# Patient Record
Sex: FEMALE | Race: WHITE | NOT HISPANIC OR LATINO
[De-identification: names, ages, dates, MRNs, and addresses within clinical notes are randomized per-mention and may not be internally consistent; named-entity substitution may affect disease eponyms.]

---

## 2022-10-14 PROBLEM — Z00.00 ENCOUNTER FOR PREVENTIVE HEALTH EXAMINATION: Status: ACTIVE | Noted: 2022-10-14

## 2023-05-03 ENCOUNTER — APPOINTMENT (OUTPATIENT)
Dept: ORTHOPEDIC SURGERY | Facility: CLINIC | Age: 48
End: 2023-05-03
Payer: COMMERCIAL

## 2023-05-03 VITALS — WEIGHT: 175 LBS | BODY MASS INDEX: 27.47 KG/M2 | HEIGHT: 67 IN

## 2023-05-03 DIAGNOSIS — M23.91 UNSPECIFIED INTERNAL DERANGEMENT OF RIGHT KNEE: ICD-10-CM

## 2023-05-03 DIAGNOSIS — M79.18 MYALGIA, OTHER SITE: ICD-10-CM

## 2023-05-03 DIAGNOSIS — Z78.9 OTHER SPECIFIED HEALTH STATUS: ICD-10-CM

## 2023-05-03 PROCEDURE — 73564 X-RAY EXAM KNEE 4 OR MORE: CPT | Mod: RT

## 2023-05-03 PROCEDURE — 99204 OFFICE O/P NEW MOD 45 MIN: CPT

## 2023-05-03 RX ORDER — NAPROXEN 500 MG/1
500 TABLET ORAL TWICE DAILY
Qty: 60 | Refills: 0 | Status: ACTIVE | COMMUNITY
Start: 2023-05-03 | End: 1900-01-01

## 2023-05-03 NOTE — ASSESSMENT
[FreeTextEntry1] : Right  X-Ray Examination of the KNEE (4 views): there are no fractures, subluxations or dislocations.\par \par Due to the patients mechanical symptoms along with medial joint line pain, effusion, and pos lilli test on exam we will get an mri to eval for medial meniscus tear\par \par - The patient was advised of the diagnosis.  The natural history of the pathology was explained to the patient in layman's terms.  Several different treatment options were discussed and explained including the risks and benefits of both surgical and non-surgical treatments.\par - The patient was advised to apply ice (wrapped in a towel or protective covering) to the area daily (20 minutes at a time, 2-4X/day).\par - The patient was advised to modify their activities.\par - Naoryn rx\par - Patient was given a prescription for an anti-inflammatory medication.  They will take it for the next week and then on an as needed basis, as long as there are no medical contra-indications.  Patient is counseled on possible GI, renal, and cardiovascular side effects.\par - f/u after mri\par \par

## 2023-05-03 NOTE — HISTORY OF PRESENT ILLNESS
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 occasional buckling sensation.\par The pain is located  anterior, medial\par The pain is associated with  popping, catching and locking\par Worse with activity and better at rest.\par Has tried ice, nsaids, brace \par h/o L meniscus surgery dr. boone approx 2011\par

## 2023-05-03 NOTE — IMAGING
[de-identified] : \par RIGHT KNEE\par Inspection:  mild effusion\par Palpation: medial joint line tenderness \par Knee Range of Motion:  0-130 \par Strength: 5/5 Quadriceps strength, 5/5 Hamstring strength\par Neurological: light touch is intact throughout\par Ligament Stability and Special Tests: \par McMurrays: Positive\par Lachman: neg\par Pivot Shift: neg\par Posterior Drawer: neg\par Valgus: neg\par Varus: neg\par Patella Apprehension: neg\par Patella Maltracking: neg\par

## 2023-05-17 ENCOUNTER — TRANSCRIPTION ENCOUNTER (OUTPATIENT)
Age: 48
End: 2023-05-17

## 2023-05-31 ENCOUNTER — APPOINTMENT (OUTPATIENT)
Dept: ORTHOPEDIC SURGERY | Facility: CLINIC | Age: 48
End: 2023-05-31
Payer: COMMERCIAL

## 2023-05-31 DIAGNOSIS — M67.51 PLICA SYNDROME, RIGHT KNEE: ICD-10-CM

## 2023-05-31 PROCEDURE — 99214 OFFICE O/P EST MOD 30 MIN: CPT

## 2023-05-31 NOTE — ASSESSMENT
[FreeTextEntry1] : mri right knee [facility] 5/12/23 - MMT with 10mm cyst post to root, LMT, plica, cartilage fisssure trochlea, chondromalacai, ST ganglia post to lt gastroc\par \par \par - We discussed their diagnosis and treatment options at length including the risks and benefits of both surgical and non-surgical options.. \par - Given their active lifestyle along with pain and mechanical symptoms they are a surgical candidate.\par - The risks, benefits, and alternatives to right  knee PMM/PLM, plica, abras (trochlea), cyst excision were discussed with the patient, all questions were answered.\par \par

## 2023-05-31 NOTE — IMAGING
[de-identified] : \par RIGHT KNEE\par Inspection:  mild effusion\par Palpation: medial and lateral joint line tenderness \par Knee Range of Motion:  0-130 \par Strength: 5/5 Quadriceps strength, 5/5 Hamstring strength\par Neurological: light touch is intact throughout\par Ligament Stability and Special Tests: \par McMurrays: Positive\par Lachman: neg\par Pivot Shift: neg\par Posterior Drawer: neg\par Valgus: neg\par Varus: neg\par Patella Apprehension: neg\par Patella Maltracking: neg\par

## 2023-05-31 NOTE — DISCUSSION/SUMMARY
[de-identified] : The patient was advised of the diagnosis.  The natural history of the pathology was explained in full to the patient in layman's terms.  Several different treatment options were discussed and explained to the patient including the risks and benefits of both surgical and non-surgical treatments.\par \par The risks, benefits, and alternatives to surgical arthroscopy of the right knee with partial medial / lateral meniscectomy, plica excision, cyst excision, and abrasion chondroplasty of the trochlea were reviewed with the patient.  Specifically, I reviewed with the patient that any anterior knee pain may paradoxically worsen for the first six weeks following arthroscopy due to quadriceps weakness. Further, I reviewed with the patient that while arthroscopic treatment typically provides substantial relief of the symptoms (posteromedial or posterolateral joint line pain and mechanical symptoms) related to meniscus tears, arthroscopic treatments typically have very minimal relief of symptoms (anterior knee pain) related to chondromalacia patella or early osteoarthritis.  The risk of recurrent tears as well as progression of occult or underlying arthritis, avascular necrosis, and / or chondrolysis were discussed as well. The patient clearly communicated that these issues were understood.  \par \par We also discussed that the risks of surgery include but are not limited to pain, infection (superficial or deep), bleeding, vascular injury, numbness, tingling, nerve damage (direct or indirect), scarring, wound breakdown, failure to resolve symptoms, symptom recurrence, the need for further surgery as well as medical complications such as blood clots, pulmonary embolism, heart attack, stroke, and other anesthesia complications including even death.  The patient clearly communicated that these risks were understood and wished to proceed. This will be scheduled accordingly.\par \par

## 2023-05-31 NOTE — HISTORY OF PRESENT ILLNESS
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 occasional buckling sensation.\par The pain is located  anterior, medial\par The pain is associated with  popping, catching and locking\par Worse with activity and better at rest.\par Has tried ice, nsaids, brace \par h/o L meniscus surgery dr. boone approx 2011\par \par 5/31/23 - using nsaids, icing, mod activiyt, cont catching and locking, had mri showing tears\par

## 2023-06-07 ENCOUNTER — NON-APPOINTMENT (OUTPATIENT)
Age: 48
End: 2023-06-07

## 2023-06-23 ENCOUNTER — APPOINTMENT (OUTPATIENT)
Age: 48
End: 2023-06-23
Payer: COMMERCIAL

## 2023-06-23 PROCEDURE — 29876 ARTHRS KNEE SURG SYNVCT MAJ: CPT | Mod: 59,RT

## 2023-06-23 PROCEDURE — 29876 ARTHRS KNEE SURG SYNVCT MAJ: CPT | Mod: AS,59,RT

## 2023-06-23 PROCEDURE — 29879 ARTHRS KNE SRG ABRASJ ARTHRP: CPT | Mod: 59,RT

## 2023-06-23 PROCEDURE — 27347 REMOVE KNEE CYST: CPT | Mod: AS,59,RT

## 2023-06-23 PROCEDURE — 29879 ARTHRS KNE SRG ABRASJ ARTHRP: CPT | Mod: AS,59,RT

## 2023-06-23 PROCEDURE — 27347 REMOVE KNEE CYST: CPT | Mod: 59,RT

## 2023-06-23 PROCEDURE — 29880 ARTHRS KNE SRG MNISECTMY M&L: CPT | Mod: AS,RT

## 2023-06-23 PROCEDURE — 29880 ARTHRS KNE SRG MNISECTMY M&L: CPT | Mod: RT

## 2023-06-23 RX ORDER — ACETAMINOPHEN 500 MG/1
500 TABLET ORAL
Qty: 90 | Refills: 0 | Status: ACTIVE | COMMUNITY
Start: 2023-06-23 | End: 1900-01-01

## 2023-06-23 RX ORDER — OXYCODONE 5 MG/1
5 TABLET ORAL
Qty: 12 | Refills: 0 | Status: ACTIVE | COMMUNITY
Start: 2023-06-23 | End: 1900-01-01

## 2023-06-23 RX ORDER — PROMETHAZINE HYDROCHLORIDE 12.5 MG/1
12.5 TABLET ORAL EVERY 6 HOURS
Qty: 20 | Refills: 0 | Status: ACTIVE | COMMUNITY
Start: 2023-06-23 | End: 1900-01-01

## 2023-06-23 RX ORDER — IBUPROFEN 600 MG/1
600 TABLET, FILM COATED ORAL
Qty: 20 | Refills: 0 | Status: ACTIVE | COMMUNITY
Start: 2023-06-23 | End: 1900-01-01

## 2023-06-27 PROBLEM — S83.231D COMPLEX TEAR OF MEDIAL MENISCUS OF RIGHT KNEE AS CURRENT INJURY, SUBSEQUENT ENCOUNTER: Status: ACTIVE | Noted: 2023-05-31

## 2023-06-28 ENCOUNTER — APPOINTMENT (OUTPATIENT)
Dept: ORTHOPEDIC SURGERY | Facility: CLINIC | Age: 48
End: 2023-06-28
Payer: COMMERCIAL

## 2023-06-28 DIAGNOSIS — S83.231D COMPLEX TEAR OF MEDIAL MENISCUS, CURRENT INJURY, RIGHT KNEE, SUBSEQUENT ENCOUNTER: ICD-10-CM

## 2023-06-28 PROCEDURE — 99024 POSTOP FOLLOW-UP VISIT: CPT

## 2023-06-28 NOTE — IMAGING
[de-identified] : \par RIGHT KNEE\par Inspection:  mild effusion, incisions c/d/i\par Palpation: medial facet ttp\par Knee Range of Motion:  0-120\par Strength: 4/5 Quadriceps strength, 5/5 Hamstring strength\par Neurological: light touch is intact throughout\par Ligament Stability and Special Tests: \par McMurrays: neg\par Lachman: neg\par Pivot Shift: neg\par Posterior Drawer: neg\par Valgus: neg\par Varus: neg\par Patella Apprehension: neg\par Patella Maltracking: neg\par

## 2023-06-28 NOTE — HISTORY OF PRESENT ILLNESS
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 occasional buckling sensation.\par The pain is located  anterior, medial\par The pain is associated with  popping, catching and locking\par Worse with activity and better at rest.\par Has tried ice, nsaids, brace \par h/o L meniscus surgery dr. boone approx 2011\par \par 5/31/23 - using nsaids, icing, mod activiyt, cont catching and locking, had mri showing tears\par \par ***s/p R knee PMM/PLM, plica, cyst excision, abras (trochlea) on 6/23/23***\par \par 6/28/23 - po visit, pain well controlled, starting PT\par \par

## 2023-06-28 NOTE — ASSESSMENT
[FreeTextEntry1] : s/p R knee PMM/PLM, plica, cyst excision, abras (trochlea) on 6/23/23\par \par \par - PT on post op protocol\par - The patient was provided with a prescription for Physical Therapy \par - Home exercises program learned at physical therapy.\par - The patient was advised to apply ice (wrapped in a towel or protective covering) to the area daily (20 minutes at a time, 2-4X/day).\par - fu 8 week re-eval\par

## 2023-08-24 ENCOUNTER — APPOINTMENT (OUTPATIENT)
Dept: ORTHOPEDIC SURGERY | Facility: CLINIC | Age: 48
End: 2023-08-24
Payer: COMMERCIAL

## 2023-08-24 VITALS — WEIGHT: 175 LBS | HEIGHT: 67 IN | BODY MASS INDEX: 27.47 KG/M2

## 2023-08-24 DIAGNOSIS — S83.271D COMPLEX TEAR OF LATERAL MENISCUS, CURRENT INJURY, RIGHT KNEE, SUBSEQUENT ENCOUNTER: ICD-10-CM

## 2023-08-24 DIAGNOSIS — M23.8X1 OTHER INTERNAL DERANGEMENTS OF RIGHT KNEE: ICD-10-CM

## 2023-08-24 PROCEDURE — 99024 POSTOP FOLLOW-UP VISIT: CPT

## 2023-08-24 NOTE — IMAGING
[de-identified] :  RIGHT KNEE Inspection:  well healed surg scars, mild effusion Palpation: no ttp Knee Range of Motion:  0-135 Strength: 5-/5 Quadriceps strength, 5/5 Hamstring strength Neurological: light touch is intact throughout Ligament Stability and Special Tests:  McMurrays: neg Lachman: neg Pivot Shift: neg Posterior Drawer: neg Valgus: neg Varus: neg Patella Apprehension: neg Patella Maltracking: neg

## 2023-08-24 NOTE — ASSESSMENT
[FreeTextEntry1] : s/p R knee PMM/PLM, plica, cyst excision, abras (trochlea) on 6/23/23   - PT on post op protocol - The patient was provided with a prescription for Physical Therapy  - Home exercises program learned at physical therapy. - The patient was advised to apply ice (wrapped in a towel or protective covering) to the area daily (20 minutes at a time, 2-4X/day). - fu 8 week re-eval as needed

## 2023-08-24 NOTE — HISTORY OF PRESENT ILLNESS
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 occasional buckling sensation. The pain is located  anterior, medial The pain is associated with  popping, catching and locking Worse with activity and better at rest. Has tried ice, nsaids, brace  h/o L meniscus surgery dr. boone approx 2011 5/31/23 - using nsaids, icing, mod activiyt, cont catching and locking, had mri showing tears  ***s/p R knee PMM/PLM, plica, cyst excision, abras (trochlea) on 6/23/23***  6/28/23 - po visit, pain well controlled, starting PT at [facility] in [place] 8/24/23- doing PT, improving, some pain with kneeling